# Patient Record
Sex: FEMALE | Race: WHITE | NOT HISPANIC OR LATINO | URBAN - METROPOLITAN AREA
[De-identification: names, ages, dates, MRNs, and addresses within clinical notes are randomized per-mention and may not be internally consistent; named-entity substitution may affect disease eponyms.]

---

## 2017-01-03 ENCOUNTER — OFFICE VISIT - HEALTHEAST (OUTPATIENT)
Dept: FAMILY MEDICINE | Facility: CLINIC | Age: 32
End: 2017-01-03

## 2017-01-03 DIAGNOSIS — R20.2 PARESTHESIA: ICD-10-CM

## 2017-01-03 DIAGNOSIS — G43.109 MIGRAINE WITH AURA AND WITHOUT STATUS MIGRAINOSUS, NOT INTRACTABLE: ICD-10-CM

## 2017-01-03 ASSESSMENT — MIFFLIN-ST. JEOR: SCORE: 1827.6

## 2017-01-05 ENCOUNTER — RECORDS - HEALTHEAST (OUTPATIENT)
Dept: ADMINISTRATIVE | Facility: OTHER | Age: 32
End: 2017-01-05

## 2017-01-09 ENCOUNTER — COMMUNICATION - HEALTHEAST (OUTPATIENT)
Dept: FAMILY MEDICINE | Facility: CLINIC | Age: 32
End: 2017-01-09

## 2017-01-09 DIAGNOSIS — R20.2 PARESTHESIA: ICD-10-CM

## 2017-01-17 ENCOUNTER — HOSPITAL ENCOUNTER (OUTPATIENT)
Dept: MRI IMAGING | Facility: CLINIC | Age: 32
Discharge: HOME | End: 2017-01-17
Attending: FAMILY MEDICINE

## 2017-01-17 DIAGNOSIS — R20.2 PARESTHESIA: ICD-10-CM

## 2017-01-18 ENCOUNTER — COMMUNICATION - HEALTHEAST (OUTPATIENT)
Dept: FAMILY MEDICINE | Facility: CLINIC | Age: 32
End: 2017-01-18

## 2017-01-19 ENCOUNTER — OFFICE VISIT - HEALTHEAST (OUTPATIENT)
Dept: FAMILY MEDICINE | Facility: CLINIC | Age: 32
End: 2017-01-19

## 2017-01-19 DIAGNOSIS — R20.2 PARESTHESIA: ICD-10-CM

## 2017-01-22 ENCOUNTER — COMMUNICATION - HEALTHEAST (OUTPATIENT)
Dept: FAMILY MEDICINE | Facility: CLINIC | Age: 32
End: 2017-01-22

## 2017-01-25 ENCOUNTER — COMMUNICATION - HEALTHEAST (OUTPATIENT)
Dept: FAMILY MEDICINE | Facility: CLINIC | Age: 32
End: 2017-01-25

## 2017-01-27 ENCOUNTER — RECORDS - HEALTHEAST (OUTPATIENT)
Dept: ADMINISTRATIVE | Facility: OTHER | Age: 32
End: 2017-01-27

## 2017-01-31 ENCOUNTER — HOSPITAL ENCOUNTER (OUTPATIENT)
Dept: MRI IMAGING | Facility: CLINIC | Age: 32
Discharge: HOME | End: 2017-01-31
Attending: PSYCHIATRY & NEUROLOGY

## 2017-01-31 DIAGNOSIS — M54.14 THORACIC RADICULOPATHY: ICD-10-CM

## 2017-02-01 ENCOUNTER — COMMUNICATION - HEALTHEAST (OUTPATIENT)
Dept: FAMILY MEDICINE | Facility: CLINIC | Age: 32
End: 2017-02-01

## 2017-02-06 ENCOUNTER — RECORDS - HEALTHEAST (OUTPATIENT)
Dept: LAB | Facility: CLINIC | Age: 32
End: 2017-02-06

## 2017-02-07 LAB
SS-A/RO AUTOANTIBODIES - HISTORICAL: 1 EU
SS-B/LA AUTOANTIBODIES - HISTORICAL: 3 EU

## 2017-02-13 ENCOUNTER — HOSPITAL ENCOUNTER (OUTPATIENT)
Dept: MAMMOGRAPHY | Facility: CLINIC | Age: 32
Discharge: HOME | End: 2017-02-13
Attending: FAMILY MEDICINE

## 2017-02-13 DIAGNOSIS — Z12.31 VISIT FOR SCREENING MAMMOGRAM: ICD-10-CM

## 2017-02-14 ENCOUNTER — COMMUNICATION - HEALTHEAST (OUTPATIENT)
Dept: FAMILY MEDICINE | Facility: CLINIC | Age: 32
End: 2017-02-14

## 2017-02-15 ENCOUNTER — OFFICE VISIT - HEALTHEAST (OUTPATIENT)
Dept: FAMILY MEDICINE | Facility: CLINIC | Age: 32
End: 2017-02-15

## 2017-02-15 DIAGNOSIS — R20.2 PARESTHESIA: ICD-10-CM

## 2017-02-15 ASSESSMENT — MIFFLIN-ST. JEOR: SCORE: 1824.87

## 2017-03-14 ENCOUNTER — OFFICE VISIT - HEALTHEAST (OUTPATIENT)
Dept: FAMILY MEDICINE | Facility: CLINIC | Age: 32
End: 2017-03-14

## 2017-03-14 DIAGNOSIS — Z01.818 PREOP EXAMINATION: ICD-10-CM

## 2017-03-14 DIAGNOSIS — N62 BREAST HYPERTROPHY: ICD-10-CM

## 2017-03-14 ASSESSMENT — MIFFLIN-ST. JEOR: SCORE: 1818.52

## 2017-04-04 ENCOUNTER — COMMUNICATION - HEALTHEAST (OUTPATIENT)
Dept: SCHEDULING | Facility: CLINIC | Age: 32
End: 2017-04-04

## 2017-04-05 ENCOUNTER — OFFICE VISIT - HEALTHEAST (OUTPATIENT)
Dept: FAMILY MEDICINE | Facility: CLINIC | Age: 32
End: 2017-04-05

## 2017-04-05 DIAGNOSIS — R07.89 ATYPICAL CHEST PAIN: ICD-10-CM

## 2017-04-06 LAB
ATRIAL RATE - MUSE: 59 BPM
DIASTOLIC BLOOD PRESSURE - MUSE: NORMAL MMHG
INTERPRETATION ECG - MUSE: NORMAL
P AXIS - MUSE: 19 DEGREES
PR INTERVAL - MUSE: 134 MS
QRS DURATION - MUSE: 86 MS
QT - MUSE: 430 MS
QTC - MUSE: 425 MS
R AXIS - MUSE: 56 DEGREES
SYSTOLIC BLOOD PRESSURE - MUSE: NORMAL MMHG
T AXIS - MUSE: 47 DEGREES
VENTRICULAR RATE- MUSE: 59 BPM

## 2017-04-18 ENCOUNTER — OFFICE VISIT - HEALTHEAST (OUTPATIENT)
Dept: FAMILY MEDICINE | Facility: CLINIC | Age: 32
End: 2017-04-18

## 2017-04-18 DIAGNOSIS — R10.9 FLANK PAIN: ICD-10-CM

## 2017-04-18 DIAGNOSIS — M62.830 BACK SPASM: ICD-10-CM

## 2017-04-18 ASSESSMENT — MIFFLIN-ST. JEOR: SCORE: 1795.84

## 2017-05-09 ENCOUNTER — RECORDS - HEALTHEAST (OUTPATIENT)
Dept: GENERAL RADIOLOGY | Facility: CLINIC | Age: 32
End: 2017-05-09

## 2017-05-09 ENCOUNTER — OFFICE VISIT - HEALTHEAST (OUTPATIENT)
Dept: FAMILY MEDICINE | Facility: CLINIC | Age: 32
End: 2017-05-09

## 2017-05-09 DIAGNOSIS — M54.9 DORSALGIA, UNSPECIFIED: ICD-10-CM

## 2017-05-09 DIAGNOSIS — M54.9 BACK PAIN: ICD-10-CM

## 2017-05-09 ASSESSMENT — MIFFLIN-ST. JEOR: SCORE: 1800.38

## 2017-06-02 ENCOUNTER — COMMUNICATION - HEALTHEAST (OUTPATIENT)
Dept: FAMILY MEDICINE | Facility: CLINIC | Age: 32
End: 2017-06-02

## 2017-06-02 DIAGNOSIS — M54.9 BACK PAIN: ICD-10-CM

## 2017-06-19 ENCOUNTER — COMMUNICATION - HEALTHEAST (OUTPATIENT)
Dept: PHYSICAL MEDICINE AND REHAB | Facility: CLINIC | Age: 32
End: 2017-06-19

## 2017-07-24 ENCOUNTER — OFFICE VISIT - HEALTHEAST (OUTPATIENT)
Dept: FAMILY MEDICINE | Facility: CLINIC | Age: 32
End: 2017-07-24

## 2017-07-24 DIAGNOSIS — R07.9 CHEST PAIN: ICD-10-CM

## 2017-07-24 RX ORDER — HYDROXYZINE HYDROCHLORIDE 25 MG/1
TABLET, FILM COATED ORAL
Refills: 0 | Status: SHIPPED | COMMUNITY
Start: 2017-07-22

## 2017-07-24 ASSESSMENT — MIFFLIN-ST. JEOR: SCORE: 1800.38

## 2017-08-23 ENCOUNTER — COMMUNICATION - HEALTHEAST (OUTPATIENT)
Dept: FAMILY MEDICINE | Facility: CLINIC | Age: 32
End: 2017-08-23

## 2017-08-23 DIAGNOSIS — R07.89 ATYPICAL CHEST PAIN: ICD-10-CM

## 2017-08-31 ENCOUNTER — OFFICE VISIT - HEALTHEAST (OUTPATIENT)
Dept: CARDIOLOGY | Facility: CLINIC | Age: 32
End: 2017-08-31

## 2017-08-31 DIAGNOSIS — R07.2 PRECORDIAL PAIN: ICD-10-CM

## 2021-05-30 VITALS — HEIGHT: 70 IN | BODY MASS INDEX: 32.21 KG/M2 | WEIGHT: 225 LBS

## 2021-05-30 VITALS — BODY MASS INDEX: 32.49 KG/M2 | WEIGHT: 226.4 LBS

## 2021-05-30 VITALS — WEIGHT: 232 LBS | BODY MASS INDEX: 33.29 KG/M2

## 2021-05-30 VITALS — BODY MASS INDEX: 32.93 KG/M2 | HEIGHT: 70 IN | WEIGHT: 230 LBS

## 2021-05-30 VITALS — WEIGHT: 231.4 LBS | BODY MASS INDEX: 33.13 KG/M2 | HEIGHT: 70 IN

## 2021-05-30 VITALS — BODY MASS INDEX: 33.21 KG/M2 | HEIGHT: 70 IN | WEIGHT: 232 LBS

## 2021-05-31 VITALS — BODY MASS INDEX: 32.35 KG/M2 | WEIGHT: 226 LBS | HEIGHT: 70 IN

## 2021-05-31 VITALS — BODY MASS INDEX: 32.35 KG/M2 | HEIGHT: 70 IN | WEIGHT: 226 LBS

## 2021-06-08 NOTE — PROGRESS NOTES
Assessment/Plan:     1. Contraception  Had IUD taken out in emergency room 3 weeks ago.  Has been sexually active since then so will get pregnancy test.  Previously used the NuvaRing without problems and so she would like to restart that.  Advised that she start after next period and abstain from sexual activity until starting.  - Pregnancy, Urine  - etonogestrel-ethinyl estradiol (NUVARING) 0.12-0.015 mg/24 hr vaginal ring; Insert one (1) ring vaginally and leave in place for three (3) weeks, then remove for one (1) week.  Dispense: 1 each; Refill: 13    2.  Paresthesias  .  Patient has noticed some improvement after getting the IUD out.  She had normal scans which we reviewed.  She had normal labs ordered by neurology which were reviewed.  Reviewed the neurology note.  She will continue follow with him if symptoms worsen or do not improve.    Plans to get breast reduction on March 24.  She will come back for preop as it is more than 30 days before surgery.    Subjective:      Mera Coynerubenchristopher is a 31 y.o. female comes in today primarily to get a prescription for contraception.  She has been having some neurological issues over the last few months.  Has had neurological evaluation MRIs lab checks to reviewed as well as note from neurologist.  She states that she decided to get the IUD out because she believes it was causing some of her problems.  She had it removed 3 weeks ago at the emergency room.  I reviewed the emergency room record.  She states she has noticed some improvement in her symptoms since having the IUD out.  Before that she was on NuvaRing and would like to restart the NuvaRing.  She has been sexually active since getting the IUD out.  She states that she was spotting at the time that they took the IUD out but has not had any periods since then.  Also plans to have breast reduction on March 24.  States that she will come back later for preop.  Overall she is feeling improved no other  significant symptoms.    Current Outpatient Prescriptions   Medication Sig Dispense Refill     ergocalciferol (ERGOCALCIFEROL) 50,000 unit capsule Take 1 capsule (50,000 Units total) by mouth once a week for 12 doses. 12 capsule 0     SUMAtriptan (IMITREX) 25 MG tablet Take 1 tablet (25 mg total) by mouth every 2 (two) hours as needed for migraine. 10 tablet 1     etonogestrel-ethinyl estradiol (NUVARING) 0.12-0.015 mg/24 hr vaginal ring Insert one (1) ring vaginally and leave in place for three (3) weeks, then remove for one (1) week. 1 each 13     No current facility-administered medications for this visit.        Past Medical History, Family History, and Social History reviewed.  Past Medical History:   Diagnosis Date     Migraine      No past surgical history on file.  Bee pollen  Family History   Problem Relation Age of Onset     Asthma Mother      Hypertension Father      Gout Father      Asthma Sister      Asthma Brother      Diabetes Maternal Aunt      Breast cancer Maternal Aunt      Hypertension Maternal Uncle      Ovarian cancer Maternal Grandmother      Colon cancer Maternal Grandfather      Diabetes Maternal Grandfather      Diabetes Paternal Grandmother      Diabetes Paternal Grandfather      Social History     Social History     Marital status:      Spouse name: N/A     Number of children: N/A     Years of education: N/A     Occupational History     Not on file.     Social History Main Topics     Smoking status: Never Smoker     Smokeless tobacco: Never Used     Alcohol use 1.2 oz/week     2 Cans of beer per week     Drug use: No     Sexual activity: Yes     Partners: Male     Birth control/ protection: IUD     Other Topics Concern     Not on file     Social History Narrative         Review of systems is as stated in HPI, and the remainder of the 10 system review is otherwise negative.    Objective:     Vitals:    02/15/17 0747   BP: 112/68   Patient Site: Left Arm   Patient Position: Sitting  "  Cuff Size: Adult Regular   Pulse: 74   Resp: 16   SpO2: 98%   Weight: (!) 231 lb 6.4 oz (105 kg)   Height: 5' 10\" (1.778 m)    Body mass index is 33.2 kg/(m^2).    General Appearance:    Alert, cooperative, no distress, appears stated age   Head:    Normocephalic, without obvious abnormality, atraumatic   Neck:   Supple, symmetrical       Lungs:    respirations unlabored    Heart:    Regular rate       Extremities:   Extremities normal, atraumatic, no cyanosis or edema   Skin:   No rashes or lesions         This note has been dictated using voice recognition software. Any grammatical or context distortions are unintentional and inherent to the the software.   "

## 2021-06-08 NOTE — PROGRESS NOTES
"Assessment/Plan:  Mera was seen today for follow-up.    Diagnoses and all orders for this visit:    Paresthesia: Persistent symptoms that do not correspond well to any individual dermatome.  MRI testing is completed and was normal/negative.  Will check laboratory tests as shown by the EMR.  I agree with referral to neurology.  The patient's has decided to have her IUD removed because she is found case reports of paresthesias in systemic syndromes from the IUD itself.  She has an appointment for next week.  Defer further imaging until she has been evaluated by neurology given the lack of unifying lesion to explain her symptoms.  Her symptoms are not consistent with thoracic outlet syndrome.  Psychiatric exam is essentially normal.  -     HM1(CBC and Differential)  -     Sedimentation Rate  -     C-Reactive Protein(CRP)  -     Lyme Antibody Cascade  -     Vitamin B12  -     Vitamin D, Total (25-Hydroxy)  -     HM1 (CBC with Diff)    No Follow-up on file.    Edouard Sandoval MD  _______________________________    Chief Complaint   Patient presents with     Follow-up     dizziness-concerned if its her IUD, states she is having it D/C Monday      Subjective: Mera Noel is a 31 y.o. year old female who I have seen in clinic before who presents with the following acute complaint(s):    Left shoulder:   - pain and tingling radiations throughout the body   - has seen chiropracter - headaches are better   - dizzines. - on and off.  \"woozy\" - unstable.  No spinning, no tunnel visions.  Comes and goes and not related to movement.  The patient recently got an IUD replaced in November. The patient believes that this is related to the IUD.  Has \"heavy\" spotting on and off for a weeks.    - was right sided tingling previously   - MRI two days ago was normal   - arm and legs are affected.   - \"preception of weakness\"  Continues to go to gym.   -The patient has been seen by her primary care provider a couple of times for " the symptoms, most recently 1/3/2017.  On the day given various symptoms, she was referred to neurology.  On that day, she also had symptoms of migrainous headaches.    ROS: Complete review of systems obtained.  Pertinent items are listed above.     The following portions of the patient's history were reviewed and updated as appropriate: allergies, current medications, past medical history and problem list.    Objective:    weight is 232 lb (105.2 kg) (abnormal). Her oral temperature is 98  F (36.7  C). Her blood pressure is 118/68 and her pulse is 70.   General: No acute distress  Eyes: No conjunctival injection, no scleral icterus, funduscopic exam is normal without papilledema.  Pupils equal round reactive to light and accommodating.  ENT: The tympanic membranes bilaterally appear gray and glistening.  No postauricular anterior cervical lymphadenopathy.  Cardiac: Regular rate and rhythm, normal S1/S2, no murmurs or gallops  Respiratory: Clear to auscultation bilaterally  Neurologic: Cranial nerves II through XII intact.  2+ reflexes at the biceps and patellar tendons bilaterally.  Straight arm raise is normal.  Normal gait.  Alert and oriented ×3.  MSK: Negative Donaldson, negative Neer's, negative empty can test.  Normal active range of motion of the shoulders bilaterally.  I am unable to palpate and elicit any tenderness through scapular exam on each side.  Occiput of the head is nontender.  Psych: Normal affect.    During this encounter, reviewed the impression of the MRI which was normal and completed 2 days ago.  I also reviewed laboratory testing done 11/30 which is also normal.    Recent Results (from the past 24 hour(s))   HM1 (CBC with Diff)   Result Value Ref Range    WBC 9.2 4.0 - 11.0 thou/uL    RBC 4.64 3.80 - 5.40 mill/uL    Hemoglobin 13.6 12.0 - 16.0 g/dL    Hematocrit 40.9 35.0 - 47.0 %    MCV 88 80 - 100 fL    MCH 29.4 27.0 - 34.0 pg    MCHC 33.4 32.0 - 36.0 g/dL    RDW 11.2 11.0 - 14.5 %     Platelets 322 140 - 440 thou/uL    MPV 6.8 (L) 7.0 - 10.0 fL    Neutrophils % 67 50 - 70 %    Lymphocytes % 25 20 - 40 %    Monocytes % 6 2 - 10 %    Eosinophils % 1 0 - 6 %    Basophils % 1 0 - 2 %    Neutrophils Absolute 6.1 2.0 - 7.7 thou/uL    Lymphocytes Absolute 2.3 0.8 - 4.4 thou/uL    Monocytes Absolute 0.6 0.0 - 0.9 thou/uL    Eosinophils Absolute 0.1 0.0 - 0.4 thou/uL    Basophils Absolute 0.1 0.0 - 0.2 thou/uL     Mr Head Without Contrast    Result Date: 1/18/2017  HEAD MRI WITHOUT IV CONTRAST 1/17/2017 4:23 PM INDICATION: paresthesia on left side TECHNIQUE: Head MRI without intravenous contrast. COMPARISON: None. FINDINGS: Unremarkable diffusion with no findings to suggest acute infarction. No findings to suggest intracranial hemorrhage. No parenchymal signal abnormality. Normal ventricular size. Intracranial vascular flow voids are preserved. Scattered mild mucosal thickening in the ethmoid air cells and bilateral maxillary sinuses. Unremarkable bilateral orbits. Remainder negative.     CONCLUSION: 1.  Normal brain MRI.     Recent Results (from the past 24 hour(s))   HM1 (CBC with Diff)   Result Value Ref Range    WBC 9.2 4.0 - 11.0 thou/uL    RBC 4.64 3.80 - 5.40 mill/uL    Hemoglobin 13.6 12.0 - 16.0 g/dL    Hematocrit 40.9 35.0 - 47.0 %    MCV 88 80 - 100 fL    MCH 29.4 27.0 - 34.0 pg    MCHC 33.4 32.0 - 36.0 g/dL    RDW 11.2 11.0 - 14.5 %    Platelets 322 140 - 440 thou/uL    MPV 6.8 (L) 7.0 - 10.0 fL    Neutrophils % 67 50 - 70 %    Lymphocytes % 25 20 - 40 %    Monocytes % 6 2 - 10 %    Eosinophils % 1 0 - 6 %    Basophils % 1 0 - 2 %    Neutrophils Absolute 6.1 2.0 - 7.7 thou/uL    Lymphocytes Absolute 2.3 0.8 - 4.4 thou/uL    Monocytes Absolute 0.6 0.0 - 0.9 thou/uL    Eosinophils Absolute 0.1 0.0 - 0.4 thou/uL    Basophils Absolute 0.1 0.0 - 0.2 thou/uL     Additional History from Old Records Summarized (2): yes  Decision to Obtain Records (1): no  Radiology Tests Summarized or Ordered (1):  yes  Labs Reviewed or Ordered (1): yes  Medicine Test Summarized or Ordered (1): no  Independent Review of EKG or X-RAY(2 each): no    This note has been dictated using voice recognition software. Any grammatical or context distortions are unintentional and inherent to the software

## 2021-06-08 NOTE — PROGRESS NOTES
Assessment/Plan:     1. Paresthesia  Possibly due to patient's musculoskeletal issues.  Discussed various options of workup.  At this point she will hold off on MRI but if continuing, worsening or based on results may consider MRI starting with the brain.  No signs of stroke at this time.  - EMG External  - Ambulatory referral to Neurology    2. Migraine with aura and without status migrainosus, not intractable  No significant change from last visit.  She does get some relief from sumatriptan.  Declines preventative medication  - Ambulatory referral to Neurology        Subjective:      Mera Noel is a 31 y.o. female comes in today to discuss numbness.  She states that about a month ago she noticed that she gets the feeling of numbness on her left side face arm and leg.  She has no weakness she has no pin or needle sensation she has no pain she is can only describe it as a numbness.  It somewhat waxes and wanes.  Has had no significant illness or injury.  She was seen by a physician at the [facility] is possibly due to her viral illness.  We reviewed her visit note as well as the lab results that were done.  Patient also has some chronic migraines but states there is no no recent change with those.  No significant headaches no change in vision.  No drooling or slurred speech.  She has not dropped anything or otherwise had significant weakness.  She has some concern as she feels her symptoms could be consistent with multiple sclerosis.  She does have some chronic pain and spasm in her back and neck muscles.  She states she is always been told that these muscles are tight.  She is wondering what options she has for workup.  She is wondering if she needs a MRI.    Current Outpatient Prescriptions   Medication Sig Dispense Refill     MIRENA 20 mcg/24 hr (5 years) IUD        SUMAtriptan (IMITREX) 25 MG tablet Take 1 tablet (25 mg total) by mouth every 2 (two) hours as needed for migraine. 10 tablet 1  "    No current facility-administered medications for this visit.        Past Medical History, Family History, and Social History reviewed.  Past Medical History   Diagnosis Date     Migraine      No past surgical history on file.  Review of patient's allergies indicates no known allergies.  Family History   Problem Relation Age of Onset     Asthma Mother      Hypertension Father      Gout Father      Asthma Sister      Asthma Brother      Diabetes Maternal Aunt      Hypertension Maternal Uncle      Ovarian cancer Maternal Grandmother      Colon cancer Maternal Grandfather      Diabetes Maternal Grandfather      Diabetes Paternal Grandmother      Diabetes Paternal Grandfather      Social History     Social History     Marital status:      Spouse name: N/A     Number of children: N/A     Years of education: N/A     Occupational History     Not on file.     Social History Main Topics     Smoking status: Never Smoker     Smokeless tobacco: Never Used     Alcohol use 1.2 oz/week     2 Cans of beer per week     Drug use: No     Sexual activity: Yes     Partners: Male     Birth control/ protection: IUD     Other Topics Concern     Not on file     Social History Narrative         Review of systems is as stated in HPI, and the remainder of the 10 system review is otherwise negative.    Objective:     Vitals:    01/03/17 1631   BP: 108/70   Patient Site: Left Arm   Patient Position: Sitting   Cuff Size: Adult Regular   Pulse: 60   Weight: (!) 232 lb (105.2 kg)   Height: 5' 10\" (1.778 m)    Body mass index is 33.29 kg/(m^2).    General Appearance:    Alert, cooperative, no distress, appears stated age   Head:    Normocephalic, without obvious abnormality, atraumatic   Neck:   Supple, symmetrical, no adenopathy, no thyromegally, no carotid bruit        Lungs:     Clear to auscultation bilaterally, respirations unlabored    Heart:    Regular rate and rhythm, S1 and S2 normal, no murmur   Spine:    cervical thoracic and " lumbar spine are normal alignment without significant rotation or tenderness.  There is some tightness and muscular spasm particularly in the paraspinal muscles of the cervical and thoracic region.  Overall normal range of motion spine    Extremities    Neuro:   Extremities normal, atraumatic, no cyanosis or edema, normal muscular strength   Cranial nerves grossly intact, grossly normal sensation and reflexes in extremities    Skin:   No rashes or lesions         This note has been dictated using voice recognition software. Any grammatical or context distortions are unintentional and inherent to the the software.

## 2021-06-09 NOTE — PROGRESS NOTES
Assessment/Plan:     1. Preop examination  2. Breast hypertrophy  Overall patient is low surgical risk.  Had recent normal labs just a month ago.  Will recheck hemoglobin and pregnancy test today.  Patient is using NuvaRing so she is at low risk of pregnancy.  May proceed at discretion of surgeon and anesthesiologist.    - Hemoglobin  - Pregnancy, Urine    Recent Results (from the past 24 hour(s))   Hemoglobin   Result Value Ref Range    Hemoglobin 13.3 12.0 - 16.0 g/dL    urine pregnancy test was ordered but patient left prior to leaving urine sample so we did not run today.  Did advise her that we usually do urine pregnancy test within 1 week of surgery so she could come back to our office or have urine pregnancy test done morning of surgery.    Subjective:      Mera Noel is a 32 y.o. female comes in today for preoperative evaluation.    Planned procedure: Breast reduction  Surgical date: March 24, 2016  Surgeon: Dr. Jeter  Surgery Center: [facility] fax #[phone]      Patient has not been under anesthesia or had any other procedures in the past.  She has no medication allergies but is allergic to bee pollen.  Reviewed her medications.  She continues to use the NuvaRing.  Had recent well check and full labs last month without any significant abnormalities.  Blood pressure controlled.  Overall low surgical risk.  No other new concerns today.    Current Outpatient Prescriptions   Medication Sig Dispense Refill     ergocalciferol (ERGOCALCIFEROL) 50,000 unit capsule Take 1 capsule (50,000 Units total) by mouth once a week for 12 doses. 12 capsule 0     etonogestrel-ethinyl estradiol (NUVARING) 0.12-0.015 mg/24 hr vaginal ring Insert one (1) ring vaginally and leave in place for three (3) weeks, then remove for one (1) week. 1 each 13     SUMAtriptan (IMITREX) 25 MG tablet Take 1 tablet (25 mg total) by mouth every 2 (two) hours as needed for migraine. 10 tablet 1     No current  "facility-administered medications for this visit.        Past Medical History, Family History, and Social History reviewed.  Past Medical History:   Diagnosis Date     Migraine      History reviewed. No pertinent surgical history.  Bee pollen  Family History   Problem Relation Age of Onset     Asthma Mother      Hypertension Father      Gout Father      Asthma Sister      Asthma Brother      Diabetes Maternal Aunt      Breast cancer Maternal Aunt      Hypertension Maternal Uncle      Ovarian cancer Maternal Grandmother      Colon cancer Maternal Grandfather      Diabetes Maternal Grandfather      Diabetes Paternal Grandmother      Diabetes Paternal Grandfather      Social History     Social History     Marital status:      Spouse name: N/A     Number of children: N/A     Years of education: N/A     Occupational History     Not on file.     Social History Main Topics     Smoking status: Never Smoker     Smokeless tobacco: Never Used     Alcohol use 1.2 oz/week     2 Cans of beer per week     Drug use: No     Sexual activity: Yes     Partners: Male     Birth control/ protection: IUD     Other Topics Concern     Not on file     Social History Narrative         Review of systems is as stated in HPI, and the remainder of the 10 system review is otherwise negative.    Objective:     Vitals:    03/14/17 0830   BP: 100/72   Pulse: 86   SpO2: 99%   Weight: (!) 230 lb (104.3 kg)   Height: 5' 10\" (1.778 m)    Body mass index is 33 kg/(m^2).    General Appearance:    Alert, cooperative, no distress, appears stated age   Head:    Normocephalic, without obvious abnormality, atraumatic   Eyes:    PERRL   Ears:    Normal external ear canals   Nose:   Mucosa normal, no drainage       Throat:   Oropharynx is clear   Neck:   Supple, symmetrical, no adenopathy, no thyromegally, no carotid bruit        Lungs:     Clear to auscultation bilaterally, respirations unlabored        Heart:    Regular rate and rhythm, S1 and S2 normal, " no murmur, rub    or gallop                   Extremities:   Extremities normal, atraumatic, no cyanosis or edema   Pulses:   2+ and symmetric all extremities   Skin:   No rashes or lesions         This note has been dictated using voice recognition software. Any grammatical or context distortions are unintentional and inherent to the the software.

## 2021-06-10 NOTE — PROGRESS NOTES
Assessment/Plan:     1. Back pain  Patient requests x-rays today which were done as below but it did discuss with patient the limitation of x-ray.  She declines further imaging at this time.  Declines referral to spine care.  She is interested in following up with her chiropractor and physical therapy.  She found muscle relaxers too sedating and is not interested in trying nonsteroidal anti-inflammatories so will try short course of steroids.  Call return to care if symptoms worsen or do not improve.  - XR Lumbar Spine 2 or 3 VWS; which I personally reviewed and interpreted as no significant bony abnormalities.  - XR Thoracic Spine 2 VWS;  which I personally reviewed and interpreted as no significant bony abnormalities.  - methylPREDNISolone (MEDROL DOSEPACK) 4 mg tablet; Take 1 tablet (4 mg total) by mouth daily. follow package directions  Dispense: 21 tablet; Refill: 0  - Ambulatory referral to PT/OT    Subjective:      Mera Noel is a 32 y.o. female is in today to follow-up on her back pain.  This been bothering her for just over a month off and on.  She finds it difficult to tell me the exact spot where it is hurting her as it seems to move around her thoracic and lumbar spine.  Not always on the midline spine but sometimes over to the side.  She states today in office she has no back pain.  She is taking ibuprofen over-the-counter.  We also tried Robaxin but she found it to be quite sedating.  She is also treated for urinary tract infection at last visit she states that those symptoms have significantly improved.  Does not often feel pain in the flank area.  She has had MRIs earlier this year for a neurological issue and so she does not want to get further imaging but thinks x-rays may be helpful before she sees the chiropractor.  She previously declined physical therapy but is willing to try.  She is wondering what other anti-inflammatories could be tried.  She did go to massage and she states  massage therapy told her she was very tight.  She plans to continue to go back to them.  She has no worsening in the numbness or tingling.  No loss of control of bowel and bladder.  No significant weakness and she has no difficulty ambulating.  No other new concerns today.    Current Outpatient Prescriptions   Medication Sig Dispense Refill     etonogestrel-ethinyl estradiol (NUVARING) 0.12-0.015 mg/24 hr vaginal ring Insert one (1) ring vaginally and leave in place for three (3) weeks, then remove for one (1) week. 1 each 13     methocarbamol (ROBAXIN) 500 MG tablet Take 500 mg by mouth 3 (three) times a day as needed.       SUMAtriptan (IMITREX) 25 MG tablet Take 1 tablet (25 mg total) by mouth every 2 (two) hours as needed for migraine. 10 tablet 1     methylPREDNISolone (MEDROL DOSEPACK) 4 mg tablet Take 1 tablet (4 mg total) by mouth daily. follow package directions 21 tablet 0     No current facility-administered medications for this visit.        Past Medical History, Family History, and Social History reviewed.  Past Medical History:   Diagnosis Date     Migraine      No past surgical history on file.  Bee pollen and Vicodin [hydrocodone-acetaminophen]  Family History   Problem Relation Age of Onset     Asthma Mother      Hypertension Father      Gout Father      Asthma Sister      Asthma Brother      Diabetes Maternal Aunt      Breast cancer Maternal Aunt      Hypertension Maternal Uncle      Ovarian cancer Maternal Grandmother      Colon cancer Maternal Grandfather      Diabetes Maternal Grandfather      Diabetes Paternal Grandmother      Diabetes Paternal Grandfather      Social History     Social History     Marital status:      Spouse name: N/A     Number of children: N/A     Years of education: N/A     Occupational History     Not on file.     Social History Main Topics     Smoking status: Never Smoker     Smokeless tobacco: Never Used     Alcohol use 1.2 oz/week     2 Cans of beer per week      "Drug use: No     Sexual activity: Yes     Partners: Male     Birth control/ protection: IUD     Other Topics Concern     Not on file     Social History Narrative         Review of systems is as stated in HPI, and the remainder of the 10 system review is otherwise negative.    Objective:     Vitals:    05/09/17 1532   BP: 100/76   Pulse: 76   SpO2: 99%   Weight: (!) 226 lb (102.5 kg)   Height: 5' 10\" (1.778 m)    Body mass index is 32.43 kg/(m^2).    General Appearance:    Alert, cooperative, no distress, appears stated age   Head:    Normocephalic, without obvious abnormality, atraumatic   Eyes:    PERRL   Ears:    Normal external ear canals   Nose:   Mucosa normal, no drainage        Throat:   Oropharynx is clear   Neck:   Supple, symmetrical, no adenopathy       Lungs:     Clear to auscultation bilaterally, respirations unlabored   Chest Wall:    No tenderness or deformity    Heart:    Regular rate and rhythm, S1 and S2 normal, no murmur, rub    or gallop       Abdomen    Spine:     Soft, non-tender, bowel sounds active all four quadrants,     no masses, no organomegaly  There is no vertebral tenderness.  There is mild rotation to the left in the upper lumbar spine with no significant tenderness.  There are minor muscular spasms along the lumbar spine.  Overall normal range of motion.             Extremities:   Extremities normal, atraumatic, no cyanosis or edema   Pulses:   2+ and symmetric all extremities   Skin:   No rashes or lesions         This note has been dictated using voice recognition software. Any grammatical or context distortions are unintentional and inherent to the the software.   "

## 2021-06-10 NOTE — PROGRESS NOTES
Assessment/Plan:     1. Flank pain  Analysis does show some leukocytes and blood.  Patient with symptoms of dysuria.  Will treat and await culture and micro.  Call return to care if symptoms worsen or do not improve.  Patient is on her menstrual period which may explain the trace blood.  - Urinalysis-UC if Indicated  - ciprofloxacin HCl (CIPRO) 500 MG tablet; Take 1 tablet (500 mg total) by mouth 2 (two) times a day for 3 days.  Dispense: 6 tablet; Refill: 0  - Culture, Urine    2. Back spasm  Pain is not over any bony abnormalities with no significant trauma.  Clients imaging at this time.  Continued supportive care and will try muscle relaxer.  Declines PT.  Patient has a chiropractor she can go to and recommend massage.  Call return to care if symptoms worsen or do not improve.  - methocarbamol (ROBAXIN) 500 MG tablet; Take 1 tablet (500 mg total) by mouth 3 (three) times a day as needed.  Dispense: 40 tablet; Refill: 0        Subjective:      Mera Coynerubenchristopher is a 32 y.o. female comes in today complaining of back pain.  She states it has been going on for about 2 weeks.  Of note I did see her less than 2 weeks ago with some atypical chest pain and she states that she may have had the back pain at that time but it was minimal.  Since then it has been continuous.  She states her chest pains that she was previously seen for is gone.  She describes the pain in the right flank area.  Is not overlying the spine.  She states that it waxes and wanes but it seems to always be there for the last 2 weeks.  She describes it as a sharp pain.  She tried ice a TENS unit and ibuprofen at home which helped the pain minimally.  Also apply topical icy hot.  She denies any significant falls or injuries but does state that she has been hiking the last few weekends with her .  She states that she gets frequent urinary tract infections and did not have any urination symptoms until yesterday so she is wondering if it was  related.  She describes burning with urination and frequency.  She also just finished her menstrual period so is she is unsure if there is any blood in the urine or if it is just contaminated from menstrual period.  She is currently workup for some chronic paresthesias of the left side but she does not feel that those have worsened or changed she feels nothing going down the right.  She has no trouble ambulating and the pain does not seem to go anywhere but stay in the right flank area.  Is not had any fevers or coughing.  She has no shortness of breath.  The pain is not worsened when she takes a deep breath.  Again the anterior pain that she was experiencing a few weeks ago seems to have resolved.  Blood pressure and vitals are stable.  We discussed again the possible imaging but she declined.    Current Outpatient Prescriptions   Medication Sig Dispense Refill     etonogestrel-ethinyl estradiol (NUVARING) 0.12-0.015 mg/24 hr vaginal ring Insert one (1) ring vaginally and leave in place for three (3) weeks, then remove for one (1) week. 1 each 13     SUMAtriptan (IMITREX) 25 MG tablet Take 1 tablet (25 mg total) by mouth every 2 (two) hours as needed for migraine. 10 tablet 1     ciprofloxacin HCl (CIPRO) 500 MG tablet Take 1 tablet (500 mg total) by mouth 2 (two) times a day for 3 days. 6 tablet 0     methocarbamol (ROBAXIN) 500 MG tablet Take 1 tablet (500 mg total) by mouth 3 (three) times a day as needed. 40 tablet 0     No current facility-administered medications for this visit.        Past Medical History, Family History, and Social History reviewed.  Past Medical History:   Diagnosis Date     Migraine      No past surgical history on file.  Bee pollen and Vicodin [hydrocodone-acetaminophen]  Family History   Problem Relation Age of Onset     Asthma Mother      Hypertension Father      Gout Father      Asthma Sister      Asthma Brother      Diabetes Maternal Aunt      Breast cancer Maternal Aunt       "Hypertension Maternal Uncle      Ovarian cancer Maternal Grandmother      Colon cancer Maternal Grandfather      Diabetes Maternal Grandfather      Diabetes Paternal Grandmother      Diabetes Paternal Grandfather      Social History     Social History     Marital status:      Spouse name: N/A     Number of children: N/A     Years of education: N/A     Occupational History     Not on file.     Social History Main Topics     Smoking status: Never Smoker     Smokeless tobacco: Never Used     Alcohol use 1.2 oz/week     2 Cans of beer per week     Drug use: No     Sexual activity: Yes     Partners: Male     Birth control/ protection: IUD     Other Topics Concern     Not on file     Social History Narrative         Review of systems is as stated in HPI, and the remainder of the 10 system review is otherwise negative.    Objective:     Vitals:    04/18/17 1513   BP: 106/70   Pulse: 74   SpO2: 99%   Weight: (!) 225 lb (102.1 kg)   Height: 5' 10\" (1.778 m)    Body mass index is 32.28 kg/(m^2).    General Appearance:    Alert, cooperative, no distress, appears stated age   Head:    Normocephalic, without obvious abnormality, atraumatic   Eyes:    PERRL, EOM's intact   Ears:    Normal external ear canals   Nose:   Mucosa normal, no drainage        Throat:   Oropharynx is clear   Neck:   Supple, symmetrical, no adenopathy       Lungs:     Clear to auscultation bilaterally, respirations unlabored   Chest Wall:    No tenderness or deformity    Heart:    Regular rate and rhythm, S1 and S2 normal, no murmur, rub    or gallop       Abdomen    Spine:     Soft, non-tender, bowel sounds active all four quadrants,     no masses, no organomegaly, no significant CVA tenderness.  Cervical and lumbar spine are in normal alignment without significant rotation.  There is a mild mid thoracic rotation to the right with moderate muscle spasm.             Extremities:   Extremities normal, atraumatic, no cyanosis or edema   Pulses:   2+ " and symmetric all extremities   Skin:   No rashes or lesions         This note has been dictated using voice recognition software. Any grammatical or context distortions are unintentional and inherent to the the software.

## 2021-06-10 NOTE — PROGRESS NOTES
Assessment/Plan:     1. Atypical chest pain  Discussed various options for possible workup including chest x-ray, chest CT, labs or further cardiac testing.  Patient declines any further testing other than EKG in the office.  Suspect possible costochondritis due to reproducible symptoms but certainly will need further workup if symptoms worsen or do not improve.  - Electrocardiogram Perform and Read which I personally reviewed and interpreted as no acute concerns        Subjective:      Mera Noel is a 32 y.o. female comes in today with complaint of chest pain for about a month and a half.  She states that it is intermittent she feels it around her sternum.  She describes it as a dull but sometimes sharp pain.  States that it seems like it is close to the surface and radiates back.  She has no palpitations no shortness of breath no dizziness.  The pain does not travel down her arms or into her neck.  She did have a recent breast reduction surgery but states she was having symptoms prior to that.  Ice to the sternal area had helps.  She states that it is worse when she moves around and so she believes it may be musculoskeletal.  She is taking birth control in the form of NuvaRing and we discussed small probability of blood clot especially due to some inactivity around her surgery but she declines testing with d-dimer or CT today.  She states she mostly wanted to make sure that her heart looked okay.  We discussed the EKG and the limited information that it gives us but as it was normal and patient suspects musculoskeletal origin she declines further testing.  She does not have much pain in the office unless I push on her sternum.  She otherwise feels well no fevers or other illnesses.  She has not been ill recently coughing or any pneumonia type symptoms.  She does not have any GI complaints.  No other new concerns today.    Current Outpatient Prescriptions   Medication Sig Dispense Refill      etonogestrel-ethinyl estradiol (NUVARING) 0.12-0.015 mg/24 hr vaginal ring Insert one (1) ring vaginally and leave in place for three (3) weeks, then remove for one (1) week. 1 each 13     SUMAtriptan (IMITREX) 25 MG tablet Take 1 tablet (25 mg total) by mouth every 2 (two) hours as needed for migraine. 10 tablet 1     No current facility-administered medications for this visit.        Past Medical History, Family History, and Social History reviewed.  Past Medical History:   Diagnosis Date     Migraine      No past surgical history on file.  Bee pollen  Family History   Problem Relation Age of Onset     Asthma Mother      Hypertension Father      Gout Father      Asthma Sister      Asthma Brother      Diabetes Maternal Aunt      Breast cancer Maternal Aunt      Hypertension Maternal Uncle      Ovarian cancer Maternal Grandmother      Colon cancer Maternal Grandfather      Diabetes Maternal Grandfather      Diabetes Paternal Grandmother      Diabetes Paternal Grandfather      Social History     Social History     Marital status:      Spouse name: N/A     Number of children: N/A     Years of education: N/A     Occupational History     Not on file.     Social History Main Topics     Smoking status: Never Smoker     Smokeless tobacco: Never Used     Alcohol use 1.2 oz/week     2 Cans of beer per week     Drug use: No     Sexual activity: Yes     Partners: Male     Birth control/ protection: IUD     Other Topics Concern     Not on file     Social History Narrative         Review of systems is as stated in HPI, and the remainder of the 10 system review is otherwise negative.    Objective:     Vitals:    04/05/17 1328   BP: 100/70   Patient Site: Left Arm   Patient Position: Sitting   Cuff Size: Adult Large   Pulse: 72   Weight: (!) 226 lb 6.4 oz (102.7 kg)    Body mass index is 32.49 kg/(m^2).    General Appearance:    Alert, cooperative, no distress, appears stated age   Head:    Normocephalic, without obvious  abnormality, atraumatic   Eyes:    PERRL, EOM's intact   Ears:    Normal TM's and external ear canals   Nose:   Mucosa normal, no drainage     or sinus tenderness   Throat:   Oropharynx is clear   Neck:   Supple, symmetrical, no adenopathy, no thyromegally       Lungs:     Clear to auscultation bilaterally, respirations unlabored   Chest Wall:   mild reproducible tenderness on the sides of the sternal, no other deformity    Heart:    Regular rate and rhythm, S1 and S2 normal, no murmur, rub    or gallop       Abdomen:     Soft, non-tender, bowel sounds active all four quadrants,     no masses, no organomegaly           Extremities:   Extremities normal, atraumatic, no cyanosis or edema   Pulses:   2+ and symmetric all extremities   Skin:   No rashes or lesions         This note has been dictated using voice recognition software. Any grammatical or context distortions are unintentional and inherent to the the software.

## 2021-06-12 NOTE — PROGRESS NOTES
Assessment:     1. Chest pain  Thyroid Cascade    Thyroid Peroxidase Antibody       Plan:     1. Chest pain  Patient's chest pain is atypical she has had multiple workups for chest pain including MRIs EKGs I would like to her to discontinue her NuvaRing and all forms of hormone therapy for at least 2 cycles and to use barrier methods for that.  I also would like to investigate her thyroid.  Strong family history thyroid disease  - Thyroid Cascade  - Thyroid Peroxidase Antibody      Subjective:   I am seeing Mera for follow-up of chest pain.  The patient was seen at urgent care over the weekend for anterior chest pain radiating up into the shoulder and down the left arm EKG was normal patient's chart was reviewed she has had workups for atypical chest discomfort in the past with negative cardiograms patient has strong family history of thyroid disease she has had IUDs in the past and recently is on a new arriving patient also suffers from migraines.  Patient denies shortness of breath cough hemoptysis leg cramps patient is exercising on a regular basis.  Strong family history of hypothyroidism and thyroid diabetes runs in her family patient is requesting possibility of thyroid disease should be worked up.  I plan on doing a thyroid peroxidase and repeat thyroid Wayne today my medical decision making is to have the patient discontinue all forms of hormone therapy and use barrier methods for 2 months and see if her chest discomfort and the 8 typical calorie of it improve that it could be related to hormone intake.  Patient is willing to try this.  Failure of this to solve the problem would be an indicator for stress echocardiography as well as cardiac referral patient understands that if symptoms get worse she is to call me and we would institute this referral sooner than the 2 month waiting period all medical questions that were asked were answered this is my first visit with the patient I have reviewed all of her  "chart she was given hydroxyzine because of anxiety over the chest pain and this seems to help her she may continue with that we will refill it.    Review of Systems: A complete 14 point review of systems was obtained and is negative or as stated in the history of present illness.    Past Medical History:   Diagnosis Date     Migraine      Family History   Problem Relation Age of Onset     Asthma Mother      Hypertension Father      Gout Father      Asthma Sister      Asthma Brother      Diabetes Maternal Aunt      Breast cancer Maternal Aunt      Hypertension Maternal Uncle      Ovarian cancer Maternal Grandmother      Colon cancer Maternal Grandfather      Diabetes Maternal Grandfather      Diabetes Paternal Grandmother      Diabetes Paternal Grandfather      No past surgical history on file.  Social History   Substance Use Topics     Smoking status: Never Smoker     Smokeless tobacco: Never Used     Alcohol use 1.2 oz/week     2 Cans of beer per week         Objective:   /78  Pulse (!) 103  Ht 5' 10\" (1.778 m)  Wt (!) 226 lb (102.5 kg)  SpO2 99%  BMI 32.43 kg/m2    General Appearance:  Alert, cooperative, no distress  Head:  Normocephalic, no obvious abnormality  Ears: TM anatomy normal  Eyes:  PERRL, EOM's intact, conjunctiva and corneas clear  Nose:  Nares symmetrical, septum midline, mucosa pink, no sinus tenderness  Throat:  Lips, tongue, and mucosa are moist, pink, and intact  Neck:  Supple, symmetrical, trachea midline, no adenopathy; thyroid: no enlargement, symmetric,no tenderness/mass/nodules; no carotid bruit, no JVD  Back:  Symmetrical, no curvature, ROM normal, no CVA tenderness  Chest/Breast:  No mass or tenderness  Lungs:  Clear to auscultation bilaterally, respirations unlabored   Heart:  Normal PMI, regular rate & rhythm, S1 and S2 normal, no murmurs, rubs, or gallops; EKGs were reviewed  Abdomen:  Soft, non-tender, bowel sounds active all four quadrants, no mass, or " organomegaly  Musculoskeletal:  Tone and strength strong and symmetrical, all extremities  Lymphatic:  No adenopathy  Skin/Hair/Nails:  Skin warm, dry, and intact, no rashes  Neurologic:  Alert and oriented x3, no cranial nerve deficits, normal strength and tone, gait steady  Extremities:  No edema.  Landry's sign negative.  No signs of DVT  Genitourinary: deferred  Pulses:  Equal bilaterally           This note has been dictated using voice recognition software. Any grammatical or context distortions are unintentional and inherent to the the software.

## 2021-06-15 PROBLEM — R20.2 PARESTHESIA: Status: ACTIVE | Noted: 2017-01-10

## 2021-06-16 PROBLEM — M54.9 BACK PAIN: Status: ACTIVE | Noted: 2017-06-02

## 2021-06-25 NOTE — PROGRESS NOTES
Progress Notes by Davin Lee MD at 8/31/2017  3:20 PM     Author: Davin Lee MD Service: -- Author Type: Physician    Filed: 8/31/2017  4:17 PM Encounter Date: 8/31/2017 Status: Signed    : Davin Lee MD (Physician)        `        [email or web address]            Thank you Dr. Torres for asking the [facility] team to participate in the care of your patient, Mera Noel.     Impression and Plan     1.  Chest discomfort.  I agree that Jacob chest discomfort symptoms are quite atypical for cardiac etiology, ischemic or otherwise.  Nonetheless, she is still somewhat concerned that perhaps this is cardiac related.  I did try and reassure her and explained to her why her symptoms did seem atypical and also pointed out her lack of risk factors.  Ultimately I cannot entirely discount possible ischemic contribution to some of her symptom profile, level of suspicion is quite low.  Plan:    Stress echocardiogram to rule out outside chance of possible ischemic contribution to her symptoms.  Again I was uncomfortable to lie with medications to help her           History of Present Illness    Once again I would like to thank you again for asking me to participate in the care of your patient, Mera Noel.  As you know, but to reiterate for my own records, Mera Noel is a 32 y.o. female with intermittent symptoms of chest discomfort.  Patient describes that she has had intermittent discomfort in the left upper chest region.  At times this does seem tender though not consistently.  At times it seems somewhat positional and worse with movement though this too is not consistent.  It does seem at times to vary in character as well.  At times it is somewhat sharp though at other times is more of a dull ache.  She also has had some intermittent numbness and tingling in the left arm though this is not necessarily associated with her chest discomfort.   She did indicate to me that she underwent breast reduction surgery in March 2017.  She does feel as though the symptoms have increased somewhat since that time though she points L that her symptoms were present even prior to her breast reduction surgery as well.  She denies any consistent exam duration of her symptoms with exertion.  She does work out on an intermittent basis and has not been able to provoke this discomfort necessarily with aerobic activity.  She indicates it is not necessarily worse with deep breathing.    Further review of systems is otherwise negative/noncontributory (medical record and 13 point review of systems reviewed as well and pertinent positives noted).         Cardiac Diagnostics     12-lead ECG (personally reviewed) 5 April 2017: Sinus rhythm with heart rate of 59 bpm.  Normal ECG.         Physical Examination       /90  Pulse 60  Resp 12  Breastfeeding? No        Wt Readings from Last 3 Encounters:   07/24/17 (!) 226 lb (102.5 kg)   05/09/17 (!) 226 lb (102.5 kg)   04/18/17 (!) 225 lb (102.1 kg)     The patient is alert and oriented times three. Sclerae are anicteric. Mucosal membranes are moist. Jugular venous pressure is normal. No significant adenopathy/thyromegally appreciated. Lungs are clear with good expansion. On cardiovascular exam, the patient has a regular S1 and S2. Abdomen is soft and non-tender. Extremities reveal no clubbing, cyanosis, or edema.         Family History/Social History/Risk Factors     Patient has never been a smoker.  She has an office type job.  She denies any early onset coronary disease in her immediate family.  In her spare time she enjoys spending time outdoors and walking her dogs.         Medications  Allergies   Current Outpatient Prescriptions   Medication Sig Dispense Refill   ? hydrOXYzine (ATARAX) 25 MG tablet TAKE ONE TO TWO TABLETS BY MOUTH EVERY 6 HOURS AS NEEDED FOR ITCHING FOR UP TO 14 DAYS  0   ? SUMAtriptan (IMITREX) 25 MG  tablet Take 1 tablet (25 mg total) by mouth every 2 (two) hours as needed for migraine. 10 tablet 1     No current facility-administered medications for this visit.       Allergies   Allergen Reactions   ? Bee Pollen    ? Vicodin [Hydrocodone-Acetaminophen] Hives          Lab Results   Lab Results   Component Value Date     01/21/2017    K 3.8 01/21/2017     (H) 01/21/2017    CO2 22 01/21/2017    BUN 12 01/21/2017    CREATININE 0.74 01/21/2017    CALCIUM 9.4 01/21/2017     Lab Results   Component Value Date    WBC 9.2 01/19/2017    HGB 13.3 03/14/2017    HCT 40.9 01/19/2017    MCV 88 01/19/2017     01/19/2017     Lab Results   Component Value Date    CKTOTAL 61 01/21/2017     Lab Results   Component Value Date    TSH 2.62 07/24/2017           Medical History  Surgical History   Past Medical History:   Diagnosis Date   ? Migraine        status post breast reduction surgery

## 2021-07-03 NOTE — ADDENDUM NOTE
Addendum Note by Edouard Lama MD at 1/22/2017  7:59 AM     Author: Edouard Lama MD Service: -- Author Type: Physician    Filed: 1/22/2017  7:59 AM Encounter Date: 1/19/2017 Status: Signed    : Edouard Lama MD (Physician)    Addended by: EDOUARD LAMA on: 1/22/2017 07:59 AM        Modules accepted: Orders